# Patient Record
Sex: FEMALE | Race: WHITE | NOT HISPANIC OR LATINO | URBAN - METROPOLITAN AREA
[De-identification: names, ages, dates, MRNs, and addresses within clinical notes are randomized per-mention and may not be internally consistent; named-entity substitution may affect disease eponyms.]

---

## 2017-06-21 ENCOUNTER — OFFICE (OUTPATIENT)
Dept: URBAN - METROPOLITAN AREA CLINIC 67 | Facility: CLINIC | Age: 72
End: 2017-06-21

## 2017-06-21 VITALS
HEART RATE: 103 BPM | DIASTOLIC BLOOD PRESSURE: 88 MMHG | SYSTOLIC BLOOD PRESSURE: 134 MMHG | HEIGHT: 66 IN | WEIGHT: 107 LBS

## 2017-06-21 DIAGNOSIS — R19.4 CHANGE IN BOWEL HABITS: ICD-10-CM

## 2017-06-21 PROCEDURE — 99204 OFFICE O/P NEW MOD 45 MIN: CPT | Performed by: INTERNAL MEDICINE

## 2017-06-21 NOTE — SERVICEHPINOTES
The patient is a very pleasant 71-year-old woman seen semi-urgently for evaluation of change in bowel movements. The patient states that baseline she will have a morning bowel movement and typically will have a bowel movement after each meal. For the last 3 weeks she states she has had constipation going up to 2 days without a satisfactory bowel movement. Often this is then followed by diarrhea. The symptoms have persisted despite being on Metamucil although after increasing the Metamucil 2 days ago, the symptoms are somewhat better. There is no rectal bleeding and she is on no new medications prior to the onset of symptoms. Patient states that she had a polyp on colonoscopy 6 years ago. There is also a family history of colon cancer in her sister at the age of 45. The patient denies any unusual psychosocial stress. She has a prior history of a partial colectomy for diverticular disease.

## 2017-07-07 ENCOUNTER — AMBULATORY SURGICAL CENTER (OUTPATIENT)
Dept: URBAN - METROPOLITAN AREA SURGERY 42 | Facility: SURGERY | Age: 72
End: 2017-07-07

## 2017-07-07 VITALS
HEIGHT: 66 IN | TEMPERATURE: 98.6 F | WEIGHT: 103 LBS | RESPIRATION RATE: 14 BRPM | OXYGEN SATURATION: 96 % | DIASTOLIC BLOOD PRESSURE: 82 MMHG | SYSTOLIC BLOOD PRESSURE: 148 MMHG | HEART RATE: 91 BPM

## 2017-07-07 DIAGNOSIS — K57.30 DVRTCLOS OF LG INT W/O PERFORATION OR ABSCESS W/O BLEEDING: ICD-10-CM

## 2017-07-07 DIAGNOSIS — R19.4 CHANGE IN BOWEL HABIT: ICD-10-CM

## 2017-07-07 DIAGNOSIS — D12.4 BENIGN NEOPLASM OF DESCENDING COLON: ICD-10-CM

## 2017-07-07 PROCEDURE — 45380 COLONOSCOPY AND BIOPSY: CPT | Performed by: INTERNAL MEDICINE

## 2018-12-27 ENCOUNTER — HOSPITAL ENCOUNTER (EMERGENCY)
Dept: HOSPITAL 54 - ER | Age: 73
Discharge: HOME | End: 2018-12-27
Payer: MEDICARE

## 2018-12-27 VITALS — WEIGHT: 106 LBS | HEIGHT: 66 IN | BODY MASS INDEX: 17.04 KG/M2

## 2018-12-27 VITALS — DIASTOLIC BLOOD PRESSURE: 90 MMHG | SYSTOLIC BLOOD PRESSURE: 114 MMHG

## 2018-12-27 DIAGNOSIS — Z98.890: ICD-10-CM

## 2018-12-27 DIAGNOSIS — Z87.01: ICD-10-CM

## 2018-12-27 DIAGNOSIS — T82.534A: Primary | ICD-10-CM

## 2018-12-27 DIAGNOSIS — Z88.8: ICD-10-CM

## 2018-12-27 NOTE — NUR
PT BIB SELF C/O IV CATH REPLACEMENT, PT IS AAOX4, NOT IN RESPIRATORY DISTRESS, 
V/S STABLE, KEPT RESTED AND COMFORTABLE.